# Patient Record
Sex: MALE
[De-identification: names, ages, dates, MRNs, and addresses within clinical notes are randomized per-mention and may not be internally consistent; named-entity substitution may affect disease eponyms.]

---

## 2024-06-25 PROBLEM — Z00.00 ENCOUNTER FOR PREVENTIVE HEALTH EXAMINATION: Status: ACTIVE | Noted: 2024-06-25

## 2024-06-28 ENCOUNTER — APPOINTMENT (OUTPATIENT)
Dept: UROLOGY | Facility: CLINIC | Age: 74
End: 2024-06-28
Payer: MEDICARE

## 2024-06-28 VITALS
DIASTOLIC BLOOD PRESSURE: 82 MMHG | SYSTOLIC BLOOD PRESSURE: 127 MMHG | TEMPERATURE: 96.26 F | WEIGHT: 190 LBS | OXYGEN SATURATION: 94 % | BODY MASS INDEX: 29.82 KG/M2 | HEART RATE: 84 BPM | HEIGHT: 67 IN

## 2024-06-28 DIAGNOSIS — R31.21 ASYMPTOMATIC MICROSCOPIC HEMATURIA: ICD-10-CM

## 2024-06-28 DIAGNOSIS — R32 UNSPECIFIED URINARY INCONTINENCE: ICD-10-CM

## 2024-06-28 LAB
BILIRUB UR QL STRIP: NORMAL
CLARITY UR: CLEAR
COLLECTION METHOD: NORMAL
GLUCOSE UR-MCNC: NORMAL
HCG UR QL: 0.2 EU/DL
HGB UR QL STRIP.AUTO: NORMAL
KETONES UR-MCNC: NORMAL
LEUKOCYTE ESTERASE UR QL STRIP: NORMAL
NITRITE UR QL STRIP: NORMAL
PH UR STRIP: 5.5
PROT UR STRIP-MCNC: NORMAL
SP GR UR STRIP: 1.02

## 2024-06-28 PROCEDURE — 99203 OFFICE O/P NEW LOW 30 MIN: CPT

## 2024-06-28 PROCEDURE — 51798 US URINE CAPACITY MEASURE: CPT

## 2024-06-29 LAB
BACTERIA UR CULT: NORMAL
PSA SERPL-MCNC: 2.69 NG/ML

## 2024-07-02 LAB — URINE CYTOLOGY: NORMAL

## 2024-07-03 ENCOUNTER — NON-APPOINTMENT (OUTPATIENT)
Age: 74
End: 2024-07-03

## 2024-07-11 ENCOUNTER — NON-APPOINTMENT (OUTPATIENT)
Age: 74
End: 2024-07-11

## 2024-08-02 ENCOUNTER — APPOINTMENT (OUTPATIENT)
Dept: UROLOGY | Facility: CLINIC | Age: 74
End: 2024-08-02
Payer: MEDICARE

## 2024-08-02 DIAGNOSIS — R32 UNSPECIFIED URINARY INCONTINENCE: ICD-10-CM

## 2024-08-02 DIAGNOSIS — R31.21 ASYMPTOMATIC MICROSCOPIC HEMATURIA: ICD-10-CM

## 2024-08-02 DIAGNOSIS — R35.0 FREQUENCY OF MICTURITION: ICD-10-CM

## 2024-08-02 LAB
BILIRUB UR QL STRIP: NORMAL
CLARITY UR: CLEAR
COLLECTION METHOD: NORMAL
GLUCOSE UR-MCNC: NORMAL
HCG UR QL: 0.2
HGB UR QL STRIP.AUTO: ABNORMAL
KETONES UR-MCNC: NORMAL
LEUKOCYTE ESTERASE UR QL STRIP: NORMAL
NITRITE UR QL STRIP: NORMAL
PH UR STRIP: 7
PROT UR STRIP-MCNC: NORMAL
SP GR UR STRIP: 1.01

## 2024-08-02 PROCEDURE — 51741 ELECTRO-UROFLOWMETRY FIRST: CPT

## 2024-08-02 PROCEDURE — 76857 US EXAM PELVIC LIMITED: CPT

## 2024-08-02 PROCEDURE — 99214 OFFICE O/P EST MOD 30 MIN: CPT

## 2024-08-02 RX ORDER — TAMSULOSIN HYDROCHLORIDE 0.4 MG/1
0.4 CAPSULE ORAL
Qty: 90 | Refills: 3 | Status: ACTIVE | COMMUNITY
Start: 2024-08-02 | End: 1900-01-01

## 2024-08-02 NOTE — PHYSICAL EXAM
[Normal Appearance] : normal appearance [General Appearance - In No Acute Distress] : no acute distress [Oriented To Time, Place, And Person] : oriented to person, place, and time [Affect] : the affect was normal [Mood] : the mood was normal

## 2024-08-02 NOTE — ASSESSMENT
[FreeTextEntry1] : I reviewed with the patient and his wife today the results of our assessment.  Uroflowmetry demonstrated an adequate bladder capacity of over 225 cc and good emptying to less than 60 cc.  Maximum urine velocity was fair at 11 mL/s and prostate was not enlarged at 33 g.  These noninvasive studies do not identify the reason for his persistent and significant lower urinary tract symptoms.  We discussed likely causes such as his continued water intake in the evening and at nighttime.  I again reviewed with the patient and his wife techniques to minimize the symptoms such as limiting water intake to slip sips rather than glasses after 5 PM.  He is somewhat resistant to this but will try it.  We also discussed empiric therapy with tamsulosin 0.4 mg including the indications risks alternatives and chances for success and he will try this.  Medication was ordered for him.  We also discussed more invasive studies to further assess and attempt to identify more clearly the exact etiology of his symptomatology.  These would include cystoscopy and urodynamics testing.  He is inclined to wait rather than proceed with these tests at this point.  We made plans for him to follow-up in the late winter early spring when he returns from [place]. Jennifer Zelaya MD

## 2024-08-02 NOTE — LETTER BODY
[Dear  ___] : Dear  [unfilled], [Courtesy Letter:] : I had the pleasure of seeing your patient, [unfilled], in my office today. [Please see my note below.] : Please see my note below. [Consult Closing:] : Thank you very much for allowing me to participate in the care of this patient.  If you have any questions, please do not hesitate to contact me. [FreeTextEntry3] : Best Regards,   Jennifer Zelaya MD

## 2024-08-02 NOTE — HISTORY OF PRESENT ILLNESS
[FreeTextEntry1] : 75 YO M seen 6/28/2024 as NPT for evaluation of urinary leakage. He describes urge incontinence over the last year or so. He also has nocturia x 5.No gross hematuria or prior  surgery. He denies any FH of cancer, he takes lorazepam and a statin and is allergic to PCN.  UA trace RBC IPSS 22 CINTHIA 7 WING 2 PVR 60 ml. The etiology of this patient's lower urinary tract symptoms and urge incontinence is not apparent at this time. I did send culture urine for culture and cytology in view of his symptoms and the asymptomatic microscopic immaturity that he describes today. Blood was also sent for PSA. I recommended that he return with a full bladder to perform uroflowmetry and pelvic ultrasound to help us decide diagnosis and appropriate treatment he is amenable to these plans and appointments were made. C+S, cytology both negative, PSA 2.69 Unable to contact patient by phone or leave , will review when he returns for further testing on 8/2/2024.  Patient seen TODAY 8/2/2024 for further testing with Uroflow and Pelvic US. He continued to have significant LUTS but also continues to drink a large amount of fluids and diet cola after 5 PM. UAA negative IPSS 25 Uroflow fair: Vol 176ml, V Max 11ml/s, V Ave 3ml/s Pelvic US: PVR 53ml, Prostate 33gm.